# Patient Record
Sex: FEMALE | Race: WHITE
[De-identification: names, ages, dates, MRNs, and addresses within clinical notes are randomized per-mention and may not be internally consistent; named-entity substitution may affect disease eponyms.]

---

## 2020-06-25 ENCOUNTER — HOSPITAL ENCOUNTER (OUTPATIENT)
Dept: HOSPITAL 53 - M LRY | Age: 25
End: 2020-06-25
Attending: PHYSICIAN ASSISTANT
Payer: COMMERCIAL

## 2020-06-25 DIAGNOSIS — M25.571: Primary | ICD-10-CM

## 2020-06-25 PROCEDURE — 73610 X-RAY EXAM OF ANKLE: CPT

## 2020-06-25 NOTE — REP
RIGHT ANKLE, FIVE VIEWS:

 

There is no evidence of an acute fracture, dislocation, or intrinsic bone

disease.  The ankle mortise is anatomic.  There is mild lateral soft tissue

swelling.

 

IMPRESSION:

 

No fracture or dislocation.

 

 

Electronically Signed by

Cedric Marie MD 06/25/2020 03:07 P

## 2021-01-07 ENCOUNTER — HOSPITAL ENCOUNTER (OUTPATIENT)
Dept: HOSPITAL 53 - M SDC | Age: 26
LOS: 1 days | Discharge: HOME | End: 2021-01-08
Attending: PLASTIC SURGERY
Payer: COMMERCIAL

## 2021-01-07 VITALS — SYSTOLIC BLOOD PRESSURE: 107 MMHG | DIASTOLIC BLOOD PRESSURE: 55 MMHG

## 2021-01-07 VITALS — DIASTOLIC BLOOD PRESSURE: 59 MMHG | SYSTOLIC BLOOD PRESSURE: 114 MMHG

## 2021-01-07 VITALS — SYSTOLIC BLOOD PRESSURE: 113 MMHG | DIASTOLIC BLOOD PRESSURE: 62 MMHG

## 2021-01-07 VITALS — BODY MASS INDEX: 30.61 KG/M2 | HEIGHT: 55 IN | WEIGHT: 132.28 LBS

## 2021-01-07 VITALS — DIASTOLIC BLOOD PRESSURE: 54 MMHG | SYSTOLIC BLOOD PRESSURE: 120 MMHG

## 2021-01-07 VITALS — DIASTOLIC BLOOD PRESSURE: 57 MMHG | SYSTOLIC BLOOD PRESSURE: 112 MMHG

## 2021-01-07 VITALS — DIASTOLIC BLOOD PRESSURE: 56 MMHG | SYSTOLIC BLOOD PRESSURE: 116 MMHG

## 2021-01-07 VITALS — SYSTOLIC BLOOD PRESSURE: 115 MMHG | DIASTOLIC BLOOD PRESSURE: 60 MMHG

## 2021-01-07 DIAGNOSIS — Z87.891: ICD-10-CM

## 2021-01-07 DIAGNOSIS — N62: Primary | ICD-10-CM

## 2021-01-07 DIAGNOSIS — N64.81: ICD-10-CM

## 2021-01-07 PROCEDURE — 96361 HYDRATE IV INFUSION ADD-ON: CPT

## 2021-01-07 PROCEDURE — 19318 BREAST REDUCTION: CPT

## 2021-01-07 PROCEDURE — 88305 TISSUE EXAM BY PATHOLOGIST: CPT

## 2021-01-07 PROCEDURE — 81025 URINE PREGNANCY TEST: CPT

## 2021-01-07 PROCEDURE — 96374 THER/PROPH/DIAG INJ IV PUSH: CPT

## 2021-01-07 RX ADMIN — FENTANYL CITRATE PRN MCG: 50 INJECTION, SOLUTION INTRAMUSCULAR; INTRAVENOUS at 14:38

## 2021-01-07 RX ADMIN — SODIUM CHLORIDE, POTASSIUM CHLORIDE, SODIUM LACTATE AND CALCIUM CHLORIDE SCH MLS/HR: 600; 310; 30; 20 INJECTION, SOLUTION INTRAVENOUS at 20:40

## 2021-01-07 RX ADMIN — FENTANYL CITRATE PRN MCG: 50 INJECTION, SOLUTION INTRAMUSCULAR; INTRAVENOUS at 14:44

## 2021-01-07 RX ADMIN — SODIUM CHLORIDE, POTASSIUM CHLORIDE, SODIUM LACTATE AND CALCIUM CHLORIDE SCH MLS/HR: 600; 310; 30; 20 INJECTION, SOLUTION INTRAVENOUS at 16:36

## 2021-01-07 NOTE — RO
OPERATIVE NOTE



DATE OF OPERATION:  01/07/2021



PREOPERATIVE DIAGNOSIS:  Bilateral breast hypertrophy.



POSTOPERATIVE DIAGNOSIS:  Bilateral breast hypertrophy.



FINDINGS: Large breasts. 



PROCEDURE:  Bilateral breast reduction. 



ATTENDING SURGEON: Jolie Tiwari DO 



ANESTHESIA: General.



SPECIMENS: Right breast 278 grams, left breast 328 grams.



ESTIMATED BLOOD LOSS: 75 mL. 



DRAINS: 10 mm Aly-Mendoza x2. 



COMPLICATIONS: None.



DESCRIPTION OF PROCEDURE: This is a 25-year-old female who presents to the

office complaining of upper back pain due to the large breasts. The patient is

very petite. She had two children and is not planning to have anymore. She did

not breastfeed. Her breasts also asymmetrical, left being slightly larger than

the right. The patient would like to have a breast reduction. Risks, benefits,

and alternatives discussed with the patient and she is ready to proceed for

surgery. 



The day of operation, she was marked in the upright position in the

preoperative holding area. Her measurements from sternal notch to the right

side is 27.5 cm and 29 cm on the left. Her IMF was 20 cm from sternal notch.

She is marked acquiring superior-medial pedicle. Informed consent confirmed.

She is brought into the operating and placed in the supine position.

Preoperative antibiotics given. Sequentials placed on the lower calves. General

anesthesia is induced. She was prepped and draped in the usual sterile fashion.

We started our procedure on the right side. The nipple-areolar complex was

outlined at 42 mm in diameter. Dissection started using electrocautery and PEAK

cautery. Inferolateral portion of the breast was resected. Hemostasis was

obtained. Then, Exparel was infiltrated throughout the breast parenchyma 6 mL

and then, the pedicle was deepithelialized using Steve scissors. The wound was

irrigated with Bacitracin irrigation solution. Then, the pedicle was turned

superiorly to its new location at 20 cm from the sternal notch and a breast

mound was recreated. Vertical limb was closed. Vertical limb is measuring at 6

cm. We used We used conforming suture of 0-Vicryl to support the mound. Excess

tissue measured inferiorly and resected creating horizontal scar, which was

closed with interrupted 3-0 Monocryl sutures as well. There was excess tissue

extending to the lateral chest wall, which was infiltrated with tumescent

solution at 200 mL. VASER liposuction was done there for two minutes and then

followed by liposuction to eliminate the rest of the fat from the lateral

breast to the lateral chest totaling 200 mL. Then, total reduction on the right

side was 278 and liposuction 200. We finished placing nipple-areolar complex in

the new position and securing it with 3-0 and 4-0 Monocryl sutures, as well as

a 5-0 blade. A 10 mm Aly-Mendoza drain was placed through the lateral portion

of the horizontal incision. We then turned our attention to the larger side,

the left side. Nipple-areolar complex was outlined at 42 mm in diameter and

again, resection started according to the superomedial pedicle markings. Using

electrocautery and PEAK cautery, inferolateral portion of the breast was

resected. Hemostasis was obtained. Exparel infiltrated in the breast 6 mL.

Then, the wound was irrigated and the pedicle was deepithelialized using Steve

scissors. Then it was turned superiorly to its new location at 20 cm from the

sternal notch. Breast mound was recreated using conforming 0-Vicryl sutures and

then the pillars were closed with 3-0 Monocryl sutures. The vertical pillars

were 6 cm in length. Then, excessive tissue inferiorly was tailored and

resected creating a horizontal scar. Tumescent solution was infiltrated on the

lateral chest and lateral breast of the lateral chest, and then VASER

liposuction was done for two minutes, followed by liposuction with a 3 mm

cannula with eliminating another 200 mL on the left side for symmetry. Total

dissection on the left side 328 grams. Then we continued closure of the

horizontal scar. A 10 mm Aly-Mendoza drain was placed through the lateral

portion of the horizontal incision. Nipple-areolar complex was set in place and

secured with 3-0 Monocryl, 4-0 Monocryl sutures, and a 5-0 plain. Prineo

dressing applied to the vertical and horizontal scars. Nipple-areolar complex

dressed with a Xeroform. There was foam placed on the lateral portion of the

chest where the liposuction was done. Bulky dressing and a bra were applied.

Patient extubated and upright in the room without any difficulties and

transferred to recovery in stable condition.

## 2021-01-07 NOTE — POST-OPPD
Postoperative Procedure Note


Date Of Procedure:  Jan 7, 2021





PREOPERATIVE DIAGNOSIS: Bilateral breast hypertrophy





POSTOPERATIVE DIAGNOSIS: same





FINDINGS: large breasts 





PROCEDURE: Bilateral breast reduction





SURGEON: Dr Schafer





ANESTHESIA: General





SPECIMENS: Right breast 278 gm, Left breast 328 gm





ESTIMATED BLOOD LOSS: 75cc





REPLACED: none





DRAINS: 10 mm LISANDRA x 2





COMPLICATIONS: none





POSTOPERATIVE CONDITION: stable





Dictation: [id number]











FRANSISCA SCHAFER DO               Jan 7, 2021 14:21

## 2021-01-08 VITALS — SYSTOLIC BLOOD PRESSURE: 101 MMHG | DIASTOLIC BLOOD PRESSURE: 56 MMHG

## 2021-01-08 VITALS — DIASTOLIC BLOOD PRESSURE: 57 MMHG | SYSTOLIC BLOOD PRESSURE: 102 MMHG

## 2021-01-08 NOTE — IPNPDOC
Subjective


General


Date Seen:  Jan 8, 2021





Subject


Chief Complaint/History


The patient is a 25-year-old female admitted with a reason for visit of 

Bilateral Breast Hypertrophy, Ptosis. Patient is doing well this morning. Pain 

controlled. She has episode of nausea last night. Today nausea resolved and she 

is tolerating clear liquids with no problems.





Current Medications


Current Medications





Current Medications








 Medications


  (Trade)  Dose


 Ordered  Sig/Mike


 Route


 PRN Reason  Start Time


 Stop Time Status Last Admin


Dose Admin


 


 Acetaminophen


  (Tylenol Tab)  650 mg  Q6H  PRN


 PO


 MILD PAIN (PS 1-4)  1/7/21 14:30


    1/8/21 09:21





 


 Fentanyl Citrate


  (Sublimaze)  25 mcg  Q5MP  PRN


 IV


 PAIN LEVEL 5-10  1/7/21 14:45


 1/7/21 15:59 DC 1/7/21 14:44





 


 Ketorolac


 Tromethamine


  (ToRADol)  10 mg  Q6HP  PRN


 PO


 MODERATE PAIN (PS 5-7)  1/7/21 14:30


 1/12/21 14:29  1/7/21 20:10





 


 Lactated Ringer's  1,000 ml @ 


 75 mls/hr  [id number]


 IV


   1/7/21 14:21


    1/7/21 20:40





 


 Lactated Ringer's  1,000 ml @ 


 100 mls/hr  Q10H


 IV


   1/7/21 14:45


 1/7/21 15:59 DC  





 


 Ondansetron HCl


  (ZOFRAN


 INJection)  4 mg  Q4H  PRN


 IV


 NAUSEA OR VOMITING  1/7/21 14:30


    1/7/21 20:37





 


 Ondansetron HCl


  (ZOFRAN


 INJection)  4 mg  Q4HP  PRN


 IV


 NAUSEA OR VOMITING  1/7/21 14:45


 1/7/21 15:59 DC 1/7/21 14:51





 


 Oxycodone HCl


  (Roxicodone,


 Oxyir)  5 mg  ASDIRECTED  PRN


 PO


 PAIN LEVEL 1-4  1/7/21 14:45


 1/7/21 15:59 DC  





 


 Oxycodone/


 Acetaminophen


  (Percocet 5mg/


 325mg Tablet)  2 tab  Q4HP  PRN


 PO


 PAIN LEVEL 8-10  1/7/21 14:30


     














Allergies


Coded Allergies:  


     No Known Allergies (Unverified , 1/7/21)





Objective


Physical Examination


Examination


GENERAL APPEARANCE:Patient seen, laying in bed, awake, alert, and oriented. 

Comfortable, in no acute distress.


SKIN: Warm and moist.


BREAST: Right and left soft, non-tender incisions intact. LISANDRA drains: Left 50 

right 60 cc/24 hr. NAC: Viable, warm, symmetrical, mild post-op ecchymosis, no 

expanding hematoma.      


LUNGS: Clear to auscultation bilaterally. No wheezing appreciated.


HEART: No chest wall abnormalities. Regular rate and rhythm with no murmurs 

appreciated.


ABDOMEN: Abdomen is soft, non-tender, non-distended. 


EXTREMITIES: No edema identified. No calf tenderness.


Vital Signs





Vital Signs








  Date Time  Temp Pulse Resp B/P (MAP) Pulse Ox O2 Delivery O2 Flow Rate FiO2


 


1/8/21 06:00 97.9 88 17 101/56 (71) 98 Room Air  


 


1/7/21 14:35       2 








I&Os











I&O- Last 24 Hours up to 6 AM 


 


 1/8/21





 06:00


 


Intake Total 4510 ml


 


Output Total 950 ml


 


Balance 3560 ml











Impression


Status post bilateral breast reduction postop day 1.


Stable for discharge.


Instructions are given regarding monitoring the drains.


Dressings changed, continue with support bra.


Patient follow-up with plastic surgery office next week.





Plan / VTE


VTE Prophylaxis Ordered?:  Yes











FRANSISCA SCHAFER DO               Jan 8, 2021 10:19